# Patient Record
Sex: FEMALE | Race: WHITE | HISPANIC OR LATINO | Employment: OTHER | URBAN - METROPOLITAN AREA
[De-identification: names, ages, dates, MRNs, and addresses within clinical notes are randomized per-mention and may not be internally consistent; named-entity substitution may affect disease eponyms.]

---

## 2021-08-25 ENCOUNTER — OFFICE VISIT (OUTPATIENT)
Dept: URBAN - METROPOLITAN AREA CLINIC 35 | Facility: CLINIC | Age: 71
End: 2021-08-25

## 2021-08-25 ENCOUNTER — DASHBOARD ENCOUNTERS (OUTPATIENT)
Age: 71
End: 2021-08-25

## 2021-08-25 VITALS
BODY MASS INDEX: 28.07 KG/M2 | WEIGHT: 143 LBS | DIASTOLIC BLOOD PRESSURE: 72 MMHG | SYSTOLIC BLOOD PRESSURE: 122 MMHG | HEIGHT: 60 IN | OXYGEN SATURATION: 98 % | HEART RATE: 96 BPM

## 2021-08-25 PROBLEM — 105997008: Status: ACTIVE | Noted: 2021-08-25

## 2021-08-25 NOTE — HPI-MIGRATED HPI
;     Abdominal Pain : 70 year old patient present today for consult of abdominal pain. Patient admits occasional abdominal pain that is located in the RUQ and is described as a burning sensation. It started 2-3 yrs after cholecystectomy in [facility]; she described it was an emergent surgery due to Peritonitis. Patients describes bloating occurring in the same region. She is describes feeling full which has caused a change in appetite.   Pain started 2012 and occurs occasionally since her Cholecystectomy. Patient admits aggravating factor will be when she is doing her daily activities.  1 BM per day, with formed normal to loose stool. She denies melena, blood, or mucus in stool. No melena.  Patient denies the use of antibiotics within the last (3-6) months.  Denies a family history of colon cancer or diseases/esophageal or gastric cancer or diseases.    Patient admits to a past Colonoscopy in [facility] that was done more than 10 years ago.  No weight loss No CP, SOB or fever. No blood thinners. No sleep apnea No cardiac or pulmonary issues  CT Scan of th abdomen and Pelvis w/ Contrast 09.02.20 1. No acute intra-abdominal or pelvic pathology detected. Follow-up as clinically indicated. 2. No evidence of renal calculi or hydronephrosis. No focal lesion in the kidneys or urinary bladder. 3. 5.9 x 4.5 cm thin-walled unilocular right ovarian cyst. Recommended comparison with prior imagining/ follow-up ultrasound given patient's age. Neoplastic process is not entirely excluded. 4. Status post cholecystectomy with mild pneumobilia.    Labs 08.31.21 WBC 6.7 RBC 3.73 Hemoglobin 11.5 Hematocrit 35.1 MCV 94 MCH 30.8 MCHC 32.8  Platelets 379 Creatine 1.08 Potassium 5.2 Chloride 104 Calcium 9.5 Albumin 4.4 Bilirium 0.2 Alkaline Phosphate 94 AST 14 ALT 9  GI MD Note: patient get full easily. She eats small meals at a time. No heartburn. No dysphagia. No weight loss. No hx of PUD. No NSAID/ASA ;

## 2021-08-25 NOTE — EXAM-MIGRATED EXAMINATIONS
----- Message from Alison Garcia MD sent at 3/11/2019  4:08 PM CDT -----  Please let mom know that HgbA1C is normal. No intervention required. Thanks.   GENERAL APPEARANCE: - alert, in no acute distress, well developed, well nourished;   HEAD: - normocephalic, atraumatic;   EYES: - sclera anicteric bilaterally;   ORAL CAVITY: - mucosa moist, MP 1;   THROAT: - clear;   NECK/THYROID: - neck supple, full range of motion, no thyromegaly, trachea midline;   SKIN: - warm and dry, no spider angiomata, palmar erythema or icterus;   HEART: - no murmurs, regular rate and rhythm, S1, S2 normal;   LUNGS: - clear to auscultation bilaterally, good air movement, no wheezes, rales, rhonchi;   ABDOMEN: - bowel sounds present, no masses palpable, no organomegaly , no rebound tenderness, soft, nontender, nondistended;   MUSCULOSKELETAL: - normal posture, normal gait and station, no decreased range of motion;   EXTREMITIES: - no clubbing, cyanosis, or edema;   PSYCH: - cooperative with exam, mood/affect full range;

## 2021-09-27 ENCOUNTER — TELEPHONE ENCOUNTER (OUTPATIENT)
Dept: URBAN - METROPOLITAN AREA CLINIC 35 | Facility: CLINIC | Age: 71
End: 2021-09-27

## 2021-10-20 ENCOUNTER — OFFICE VISIT (OUTPATIENT)
Dept: URBAN - METROPOLITAN AREA SURGERY CENTER 8 | Facility: SURGERY CENTER | Age: 71
End: 2021-10-20

## 2021-11-03 ENCOUNTER — OFFICE VISIT (OUTPATIENT)
Dept: URBAN - METROPOLITAN AREA CLINIC 35 | Facility: CLINIC | Age: 71
End: 2021-11-03

## 2021-11-03 NOTE — HPI-MIGRATED HPI
;     Abdominal Pain : Patient presents today for a follow up for her EGD. She was a no show to her procedure.   -- BM's per day. -- stool. No melena, blood, or mucus.   Last visit: 08/25/21 70 year old patient present today for consult of abdominal pain. Patient admits occasional abdominal pain that is located in the RUQ and is described as a burning sensation. It started 2-3 yrs after cholecystectomy in [facility]; she described it was an emergent surgery due to Peritonitis. Patients describes bloating occurring in the same region. She is describes feeling full which has caused a change in appetite.   Pain started 2012 and occurs occasionally since her Cholecystectomy. Patient admits aggravating factor will be when she is doing her daily activities.  1 BM per day, with formed normal to loose stool. She denies melena, blood, or mucus in stool. No melena.  Patient denies the use of antibiotics within the last (3-6) months.  Denies a family history of colon cancer or diseases/esophageal or gastric cancer or diseases.    Patient admits to a past Colonoscopy in [facility] that was done more than 10 years ago.  No weight loss No CP, SOB or fever. No blood thinners. No sleep apnea No cardiac or pulmonary issues  CT Scan of th abdomen and Pelvis w/ Contrast 09.02.20 1. No acute intra-abdominal or pelvic pathology detected. Follow-up as clinically indicated. 2. No evidence of renal calculi or hydronephrosis. No focal lesion in the kidneys or urinary bladder. 3. 5.9 x 4.5 cm thin-walled unilocular right ovarian cyst. Recommended comparison with prior imagining/ follow-up ultrasound given patient's age. Neoplastic process is not entirely excluded. 4. Status post cholecystectomy with mild pneumobilia.    Labs 08.31.21 WBC 6.7 RBC 3.73 Hemoglobin 11.5 Hematocrit 35.1 MCV 94 MCH 30.8 MCHC 32.8  Platelets 379 Creatine 1.08 Potassium 5.2 Chloride 104 Calcium 9.5 Albumin 4.4 Bilirium 0.2 Alkaline Phosphate 94 AST 14 ALT 9  GI MD Note: patient get full easily. She eats small meals at a time. No heartburn. No dysphagia. No weight loss. No hx of PUD. No NSAID/ASA;